# Patient Record
Sex: FEMALE | Race: WHITE
[De-identification: names, ages, dates, MRNs, and addresses within clinical notes are randomized per-mention and may not be internally consistent; named-entity substitution may affect disease eponyms.]

---

## 2019-10-11 NOTE — HP
Admitting History and Physical





- Primary Care Physician


PCP: Yan Monterroso





- Admission


Chief Complaint: Breast cancer gene positive


History of Present Illness: 





39 year old  prmenapausal female with strong family H/O breast cancer  and 

BRCA2 positive.  2019 Mammogram  showed 4 mm assymetry right lateral 

retroareolar area. Us  favored complicated cyst but additional imaging was 

advised.  Right diagnostic mammogram and US 2019 showed  this area effaced on 

spot compression  and targeted US confirms 4mm cyst cluster rght 9:00 2 cm FN 

Birad 2.  Breast MRI 2019 Birad1.


History Source: Patient


Limitations to Obtaining History: No Limitations





- Past Medical History


...LMP: 19





- Smoking History


Smoking history: Never smoked


Have you smoked in the past 12 months: No





- Alcohol/Substance Use


Hx Alcohol Use: No





Home Medications





- Allergies


Allergies/Adverse Reactions: 


 Allergies











Allergy/AdvReac Type Severity Reaction Status Date / Time


 


No Known Allergies Allergy   Verified 10/08/19 14:59














- Home Medications


Home Medications: 


Ambulatory Orders





NK [No Known Home Medication]  10/08/19 











Family Medical History


Family Hx Cancer: Mother (Bilateral breast cancer 46/64 BRCA+)


Other Family History: maternal aunt breast ca 62 and ovarian at 69.  mat 2nd 

cousins breast ca 40's and 60's.  mat 3rd cousin breast ca  40's BRCA 2 +





Physical Examination


Constitutional: Yes: No Distress


Breast(s): Yes: Other (B sized breast cups no palpable masses or adenopathy 

bilaterally symmetrical)





Problem List





- Problems


(1) BRCA gene positive


Code(s): Z15.01 - GENETIC SUSCEPTIBILITY TO MALIGNANT NEOPLASM OF BREAST; 

Z15.09 - GENETIC SUSCEPTIBILITY TO OTHER MALIGNANT NEOPLASM   





Assessment/Plan





Bilateral total mastectomies alloderm implant reconstruction

## 2019-10-17 ENCOUNTER — HOSPITAL ENCOUNTER (INPATIENT)
Dept: HOSPITAL 74 - FM/S | Age: 39
LOS: 2 days | Discharge: HOME | DRG: 585 | End: 2019-10-19
Attending: SURGERY | Admitting: SURGERY
Payer: COMMERCIAL

## 2019-10-17 VITALS — BODY MASS INDEX: 22.3 KG/M2

## 2019-10-17 DIAGNOSIS — Z90.13: ICD-10-CM

## 2019-10-17 DIAGNOSIS — Z40.01: Primary | ICD-10-CM

## 2019-10-17 DIAGNOSIS — Z15.01: ICD-10-CM

## 2019-10-17 PROCEDURE — 4A1GXSH MONITORING OF SKIN AND BREAST VASCULAR PERFUSION USING INDOCYANINE GREEN DYE, EXTERNAL APPROACH: ICD-10-PCS | Performed by: SURGERY

## 2019-10-17 PROCEDURE — 0HTV0ZZ RESECTION OF BILATERAL BREAST, OPEN APPROACH: ICD-10-PCS | Performed by: SURGERY

## 2019-10-17 PROCEDURE — 0HRV0JZ REPLACEMENT OF BILATERAL BREAST WITH SYNTHETIC SUBSTITUTE, OPEN APPROACH: ICD-10-PCS | Performed by: SURGERY

## 2019-10-17 RX ADMIN — CEFAZOLIN SODIUM SCH MLS/HR: 1 INJECTION, SOLUTION INTRAVENOUS at 15:45

## 2019-10-17 RX ADMIN — CEFAZOLIN SODIUM SCH MLS/HR: 1 INJECTION, SOLUTION INTRAVENOUS at 20:34

## 2019-10-17 NOTE — OP
Operative Note





- Note:


Operative Date: 10/17/19


Pre-Operative Diagnosis: Genetic predispostion for breast cancer


Operation: Bilateral prophylactic mastectomy.  Bilateral breast reconstruction 

with alloderm and implants


Post-Operative Diagnosis: Same as Pre-op


Surgeon: Yan Monterroso


Assistant: Lobo Ward


Anesthesiologist/CRNA: Bonilla Reynolds


Anesthesia: General


Drains & Tubes with Location: Bilateral breast drains x 2


Operative Report Dictated: Yes

## 2019-10-17 NOTE — SURG
Surgery First Assist Note


First Assist: Jerod Harrison PA-C


Date of Service: 10/17/19


Diagnosis: 





Genetic predisposition for breast cancer


Procedure: 


Bilateral breast reconstruction with alloderm and implants s/p mastectomy





I was present for the entirety of the operative procedure. For further detail, 

please refer to operative report.








Visit type





- Case Type


Case Type: Scheduled





- Emergency


Emergency Visit: No





- New patient


This patient is new to me today: Yes


Date on this admission: 10/17/19





- Critical Care


Critical Care patient: No

## 2019-10-18 LAB
DEPRECATED RDW RBC AUTO: 12.6 % (ref 11.6–15.6)
HCT VFR BLD CALC: 28.5 % (ref 32.4–45.2)
HGB BLD-MCNC: 9.4 GM/DL (ref 10.7–15.3)
MCH RBC QN AUTO: 30 PG (ref 25.7–33.7)
MCHC RBC AUTO-ENTMCNC: 32.8 G/DL (ref 32–36)
MCV RBC: 91.5 FL (ref 80–96)
PLATELET # BLD AUTO: 234 K/MM3 (ref 134–434)
PMV BLD: 8.7 FL (ref 7.5–11.1)
RBC # BLD AUTO: 3.12 M/MM3 (ref 3.6–5.2)
WBC # BLD AUTO: 9.6 K/MM3 (ref 4–10.8)

## 2019-10-18 RX ADMIN — CEFAZOLIN SODIUM SCH MLS/HR: 1 INJECTION, SOLUTION INTRAVENOUS at 08:25

## 2019-10-18 RX ADMIN — CEFAZOLIN SODIUM SCH MLS/HR: 1 INJECTION, SOLUTION INTRAVENOUS at 21:15

## 2019-10-18 RX ADMIN — ACETAMINOPHEN PRN MG: 325 TABLET ORAL at 02:04

## 2019-10-18 RX ADMIN — CEFAZOLIN SODIUM SCH MLS/HR: 1 INJECTION, SOLUTION INTRAVENOUS at 02:04

## 2019-10-18 RX ADMIN — ACETAMINOPHEN PRN MG: 325 TABLET ORAL at 23:10

## 2019-10-18 RX ADMIN — CEFAZOLIN SODIUM SCH MLS/HR: 1 INJECTION, SOLUTION INTRAVENOUS at 15:44

## 2019-10-18 NOTE — PN
Progress Note, Physician


Chief Complaint: 





S/P bilateral mastectomy with implant and alloderm reconstruction POD#1


History of Present Illness: 





Patient was seen at the bedside and reports discomfort near upper outer poles 

bilaterally but is otherwise tolerating po well.  





- Current Medication List


Current Medications: 


Active Medications





Acetaminophen (Tylenol -)  650 mg PO Q4H PRN


   PRN Reason: FEVER


   Last Admin: 10/18/19 02:04 Dose:  650 mg


Acetaminophen (Ofirmev Injection -)  1,000 mg IVPB Q6H PRN


   PRN Reason: PAIN LEVEL 6-10


   Last Admin: 10/17/19 20:22 Dose:  1,000 mg


Cefazolin Sodium (Ancef 1 Gm Premixed Ivpb -)  1 gm in 50 mls @ 100 mls/hr IVPB 

Q6H-IV GEORGE


   Stop: 10/24/19 15:44


   Last Admin: 10/18/19 08:25 Dose:  100 mls/hr


Dextrose/Sodium Chloride (D5-1/2ns -)  1,000 mls @ 100 mls/hr IV ASDIR GEORGE


Ondansetron HCl (Zofran Injection)  4 mg IVPUSH Q6H PRN


   PRN Reason: NAUSEA AND/OR VOMITING


   Last Admin: 10/17/19 21:41 Dose:  4 mg


Oxycodone HCl (Roxicodone -)  5 mg PO Q4H PRN


   PRN Reason: PAIN LEVEL 6-10


   Last Admin: 10/18/19 08:24 Dose:  5 mg


Zolpidem Tartrate (Ambien -)  5 mg PO HS PRN


   PRN Reason: Insomnia











- Objective


Vital Signs: 


 Vital Signs











Temperature  98.7 F   10/18/19 05:00


 


Pulse Rate  96 H  10/18/19 05:00


 


Respiratory Rate  18   10/18/19 05:00


 


Blood Pressure  91/50 L  10/18/19 05:00


 


O2 Sat by Pulse Oximetry (%)  95   10/18/19 08:45











Constitutional: Yes: Well Nourished, Calm


Breast(s): Yes: Other (Flaps with minimal ecchymosis.  Nipple/areola complex 

with good color.  Dressing C/D/I. JPs with serosanginous discharge bilaterally.)


Labs: 


 CBC, BMP





 10/18/19 07:35 











Problem List





- Problems


(1) BRCA gene positive


Code(s): Z15.01 - GENETIC SUSCEPTIBILITY TO MALIGNANT NEOPLASM OF BREAST; 

Z15.09 - GENETIC SUSCEPTIBILITY TO OTHER MALIGNANT NEOPLASM   





Assessment/Plan





A: S/P bilateral mastectomy with implant reconstruction POD#1


    Doing well after surgery


P:  OOB today with assistance


     AXBX and Pain control as ordered


     AMIRA training


     Plan for discharge in am

## 2019-10-18 NOTE — PN
Progress Note (short form)





- Note


Progress Note: 














POD 1, s/p Bilateral prophylactic mastectomy.  Bilateral breast reconstruction 

with alloderm and implants 











Pt seen and examined. Reports she is feeling well. Pain is well controlled. Has 

been oob to the restroom. Voiding without issue. Tolerated dinner with no n/v. 

Denies cp/sob. 

















 Vital Signs











Temp  98.7 F   10/18/19 05:00


 


Pulse  96 H  10/18/19 05:00


 


Resp  18   10/18/19 05:00


 


BP  91/50 L  10/18/19 05:00


 


Pulse Ox  93 L  10/18/19 01:00








 Intake & Output











 10/17/19 10/17/19 10/18/19





 11:59 23:59 11:59


 


Intake Total 1200 1575 


 


Output Total  480 470


 


Balance 1200 1095 -470


 


Weight 122 lb  


 


Intake:   


 


  IV 1200 1075 


 


    Lactated Ringers Solution  375 





    1,000 ml @ 125 mls/hr IV   





    ASDIR GEORGE Rx#:   





    [id number]   


 


  IVPB  100 


 


  Oral  400 


 


Output:   


 


  Drainage  80 220


 


    #1   15


 


    #2   100


 


    #3   100


 


    #4   5


 


  Urine  400 250


 


    Void  400 250


 


Other:   


 


  Voiding Method  Toilet 


 


  Height 5 ft 2 in  


 


  Body Mass Index (BMI) 22.3  


 


  Weight Measurement Method Standing Scale  








 CBC, BMP





 10/18/19 07:35 





Gen: awake, alert, nad


Resp: unlabored on RA


Chest: + b/l ecchymosis, + b/l edema, skin flaps/areola well perfused and 

viable. Dressings c/d/i, inframammary crease steristrips intact with no 

erythema or drainage, b/ll axilla wounds with steristrips intact, clean and 

dry. Drains x4 in place, tubing stripped, scant serosanguinous drainage in 

reservoirs. 








A/P: 40 y/o F w/ strong family H/O breast cancer and BRCA2 positive, now POD 1, 

s/p Bilateral prophylactic mastectomy.  Bilateral breast reconstruction with 

alloderm and implants.











afebrile, slightly tachy (likly due to pain?), bp in 90s-low 100s


Tony outputs #1->5ml overnight, 15ml since OR


         #2->30ml overnight, 100ml since OR


         #3->30ml overnight, 100ml since OR


         #4->0ml overnight, 5ml since OR








Dressings changed, tubing stripped











-Labs stable


-Monitor and record drain outputs


-Keep bra/dressings c/d/i


-Remainder of care per Dr Monterroso





d/w attending Dr Ward

## 2019-10-18 NOTE — PN
Progress Note (short form)





- Note


Progress Note: 





Post op day#1.S/P Bilateral mastectomy with reconstruction under Ga 

uneventful.Patient stable and has some pain for which she is on medication.No 

any anesthesia related problem.Patient DC from the anesthesia care.

## 2019-10-19 VITALS — DIASTOLIC BLOOD PRESSURE: 60 MMHG | SYSTOLIC BLOOD PRESSURE: 100 MMHG | TEMPERATURE: 97.8 F | HEART RATE: 68 BPM

## 2019-10-19 RX ADMIN — ACETAMINOPHEN PRN MG: 325 TABLET ORAL at 08:42

## 2019-10-19 RX ADMIN — CEFAZOLIN SODIUM SCH MLS/HR: 1 INJECTION, SOLUTION INTRAVENOUS at 08:43

## 2019-10-19 RX ADMIN — CEFAZOLIN SODIUM SCH MLS/HR: 1 INJECTION, SOLUTION INTRAVENOUS at 02:10

## 2019-10-21 NOTE — PATH
Surgical Pathology Report



Patient Name:  ONEYDA DIMAS

Accession #:  [id number]

Med. Rec. #:  [id number]                                                        

   /Age/Gender:  1980 (Age: 39) / F

Account:  [id number]                                                          

             Location: [facility]

Taken:  10/17/2019

Received:  10/17/2019

Reported:  10/21/2019

Physicians:  Yan Monterroso M.D.

  



Specimen(s) Received

A: RIGHT BREAST RETROAREOLAR BIOPSY (FS) 

B: LEFT BREAST RETROAREOLAR BIOPSY (FS) 

C: RIGHT BREAST MASTECTOMY 

D: LEFT BREAST MASTECTOMY 

E: LEFT BREAST EXTRA MAMMARY NIPPLE 

F: RIGHT BREAST EXTRA MAMMARY NIPPLE 





Clinical History

Genetic susceptibility of breast cancer





Intraoperative Consult Diagnosis

A. Right breast retroareolar biopsy, frozen section: Negative for malignancy.

B. Left breast retroareolar biopsy, frozen section: Negative for malignancy.

ELPIDIO Lima M.D. 10/17/19









Final Diagnosis

A. RETROAREOLA, RIGHT BREAST, BIOPSY (FS):

BENIGN BREAST TISSUE; NEGATIVE FOR MALIGNANCY.



B. RETROAREOLA, LEFT BREAST, BIOPSY (FS):

BENIGN BREAST TISSUE; NEGATIVE FOR MALIGNANCY.



C. BREAST, RIGHT, NIPPLE-SPARING MASTECTOMY:

BENIGN BREAST TISSUE SHOWING FIBROCYSTIC CHANGES INCLUDING MICROCYSTS WITH

APOCRINE METAPLASIA AND USUAL DUCTAL HYPERPLASIA (UDH) AND COLUMNAR CELL CHANGE.



D. BREAST, LEFT, NIPPLE-SPARING MASTECTOMY:

BENIGN BREAST TISSUE SHOWING FIBROCYSTIC CHANGES INCLUDING MICROCYSTS AND USUAL

DUCTAL HYPERPLASIA (UDH) AND FIBROADENOMATOID CHANGE.



E. EXTRAMAMMARY NIPPLE, RIGHT BREAST, EXCISION:

EXTRAMAMMARY NIPPLE.



F. THE EXTRAMAMMARY NIPPLE, LEFT BREAST, EXCISION:

EXTRAMAMMARY NIPPLE.





***Electronically Signed***

Anita Lima M.D.





Gross Description

A. Received fresh for frozen section evaluation, labeled "right breast

retroareolar biopsy" is a 0.5 x 0.4 x 0.2 cm portion of red-tan tissue. Frozen

section is performed on the specimen. The frozen section residue is entirely

submitted in one cassette.



B. Received fresh for frozen section evaluation, labeled "left breast

retroareolar biopsy" is a 0.8 x 0.6 x 0.2 cm portion of red-tan tissue. Frozen

section is performed on the specimen. The frozen section residue is entirely

submitted in one cassette.



C. Received in formalin, labeled "right mastectomy," is a 212 gram, 12.5 x 12.5

x 3.0 cm. right mastectomy specimen with a short suture marking the superior

aspect and a long suture marking the lateral aspect of the specimen, per the

surgeon. There is no skin or nipple present. The deep margin is inked black and

the anterior soft tissue margin is inked blue. The specimen is serially

sectioned from lateral to medial. Sectioning reveals multifocal dense white

fibrous tissue. Representative sections are submitted in 14 cassettes as

follows: 1-3-upper outer quadrant; 4-6-lower outer quadrant; 7-9-upper inner

quadrant; 10-12-lower inner quadrant; 13-anterior soft tissue margin; 14-deep

margin.



D. Received in formalin, labeled "left breast mastectomy," is a 229 gram, 15.0 x

11.5 x 3.2 cm. left mastectomy specimen with a short suture marking the superior

aspect and a long suture marking the lateral aspect of the specimen, per the

surgeon. There is no skin or nipple present. The deep margin is inked black and

the anterior soft tissue margin is inked blue. The specimen is serially

sectioned from medial to lateral. Sectioning reveals abundant dense, white,

focally firm fibrous tissue. Representative sections are submitted in 15

cassettes as follows: 1-3-upper outer quadrant; 4-6-lower outer quadrant;

7-8-upper inner quadrant; 9-13-lower inner quadrant; 14-anterior soft tissue

margin; 15-deep margin.

Time to formalin fixation: 30 minutes

Total formalin fixation time: Approximately 61 hours.



E. Received in formalin labeled "right breast extramammary nipple," is a 0.6 x

0.3 cm tan-brown, elliptical, unoriented portion of skin excised to depth of 1.0

cm. No discrete epidermal lesion is identified. The specimen is bisected and

entirely submitted in one cassette.



F. Received in formalin labeled "left breast extramammary nipple," is a 0.5 x

0.2 cm tan, elliptical, unoriented portion of skin excised to depth of 1.0 cm.

No discrete epidermal lesion is identified. The specimen is bisected and

entirely submitted in one cassette.

AE/10/17/2019



ebram/10/17/2019

## 2019-10-21 NOTE — OP
DATE OF OPERATION:  10/17/2019

 

PREOPERATIVE DIAGNOSIS:  Genetic susceptibility to breast cancer, BRCA2 positive.   

 

POSTOPERATIVE DIAGNOSIS:  Genetic susceptibility to breast cancer, BRCA2 positive.   

 

PROCEDURE:  Bilateral total nipple-sparing mastectomies through an inframammary

approach with bilateral direct implant reconstruction using acellular dermal matrix.

 

ANESTHESIA:  General endotracheal anesthesia.  

 

PRIMARY SURGEON:  Skylar Torres M.D. 

 

FIRST ASSIST:  CORRINE Graham 

 

Primary surgeon for the bilateral direct implant reconstructions with acellular

dermal matrix is Skylar Ward M.D., with his first assistant CORRINE Mccarthy 

 

COMPLICATIONS:  There were no complications.  

 

DESCRIPTION OF PROCEDURE:  Briefly, the patient is a 39-year-old G2, P2 premenopausal

female of [place] descent who has a family history with her mother who had

bilateral breast cancer at age 46 and age 54, and her maternal aunt had breast cancer

at age 62, and ovarian cancer at age 69.  She has 2 maternal second cousins who had

breast cancer and a maternal third cousin tested BRCA2 positive.  The patient herself

tested BRCA2 positive in January of 2019.  She had negative mammography in October of 2018 and a negative MRI in April of 2019.  She was seen in the office regarding risk

reduction strategies for breast cancer and decided to go forward with bilateral risk

reduction prophylactic nipple sparing mastectomies.  She was well aware of the risks

of leaving some breast tissue _____.  We do retroareolar biopsies at the time of

surgery; if these show cancer, we would remove the nipples.  She understood the lack

of any evidence shown for doing prophylactic sentinel lymph node biopsy. All risks,

complications of the procedure were explained to the patient including risk of skin

flap necrosis, nipple loss, hematoma, and infection, and she consented to move

forward with the procedure.  She was seen by Dr. Ward preoperatively regarding

the implant reconstruction. The patient is brought in for the procedure on October 17, 2019.  In the holding area, site verification was made, and informed consent was

obtained.  She was marked preoperatively by the plastic surgeon.  She was brought in

to the operating room and laid on the OR table in the supine position.  Venodynes

were placed on the lower extremities prior to induction.  She received 2 g of Ancef

prior to incision.  Both breasts were sterilely prepped and draped in the usual

fashion, and she underwent general endotracheal anesthesia.  The left mastectomy was

first performed after timeout was performed.  An inframammary incision was made about

9 cm in length in the inframammary fold, and then skin edges were everted, and the

breast was retracted inferiorly using Steven clamps.  The skin flap was raised using

the PEEK radiofrequency device superiorly to the level of the clavicle, medially to

the level of the sternum, laterally to the level of the latissimus, and inferiorly

below the level of the inframammary fold.  The breast was taken out off pectoralis

major muscle using electrocautery from inferomedial to superolateral, completely

removed intact.  The specimen was removed and oriented with a long lateral, short

superior suture, and sent to pathology in formalin as specimen.  The skin flaps were

inspected to remove all gross breast tissue.  A retroareolar biopsy was taken

underneath the left nipple areolar complex, sent for frozen section and came back

negative so the nipple was spared.  Hemostasis was achieved, and the wound was

copiously irrigated with warm, sterile saline.  At this point the right nipple

sparing mastectomy was performed through a symmetrical 9-cm inframammary incision on

the right side in the inframammary fold.  Skin edges were everted and the breast was

retracted inferiorly using Twiggs clamps.  The skin flap was raised using the PEEK

radiofrequency device superiorly to the level of the clavicle, medially to the level

of the sternum, laterally to the level of the latissimus, and inferiorly to the level

of the inframammary fold.  The breast was taken down off the pectoralis major muscle

using electrocautery from inferomedial to superolateral and completely removed

intact.  It was oriented with a long lateral, short superior suture, and weighed and

then sent to pathology in formalin as right breast total mastectomy.  Skin flaps were

trimmed for a good cosmetic result, and they were inspected to remove all gross

remaining breast tissue.  A retroareolar biopsy was taken underneath the right nipple

areolar complex, sent for frozen section, came back negative, so the right nipple was

spared.  Hemostasis was achieved, and the wound was copiously irrigated with warm,

sterile saline.  At this point, Dr. Ward became the primary surgeon, and he

performed bilateral subpectoral direct implant reconstruction using acellular dermal

matrix sutured into the inferolateral aspects of both pectoralis major muscles to

allow for the direct implant reconstruction.  Two Olayinka drains were placed around

each implant and brought through separate stab incisions on the lateral skin folds

and secured in place using 3-0 nylon suture.  All wounds will be closed by plastic

surgery using dissolvable monofilament suture.  Mastisol Steri-Strips were applied

over the wounds, and she will be extubated and brought to the post anesthesia care

unit.  All sponge and needle counts were correct at this point in the case, and

estimated blood loss was about 100 mL.  She was hemodynamically stable throughout. 

The patient will be admitted postoperatively for pain and wound management.  She did

have Exparel _____ as a chest wall block along the pectoralis muscle chest wall and

subcutaneous tissues for postoperative pain relief.  We did use the SPY skin

perfusion device during the case showing good skin perfusion on both skin flaps and

nipple areolar complexes at the end of the implant reconstruction.  

 

 

SKYLAR TORRES M.D.

 

[id number]

DD: 10/17/2019 18:05

DT: 10/17/2019 20:08

Job #:  [id number]

## 2019-11-06 NOTE — OP
DATE OF OPERATION:  10/17/2019

 

This is a combined dictation with Dr. Yan Monterroso.  

 

SURGEON:  Yan Ward MD  

 

ASSISTANT SURGEON:  PAMELLA Newsome

 

PREOPERATIVE DIAGNOSIS:  Bilateral acquired chest wall deformities status post

bilateral mastectomies.  

 

POSTOPERATIVE DIAGNOSES:  

1.  Bilateral acquired chest wall deformity status post bilateral mastectomies.

2.  Personal history of genetic carcinoma.

3.  Absent bilateral breasts.

 

The patient underwent bilateral mastectomies.  This was dictated under separate cover

by Dr. Monterroso.  

 

OPERATIVE PROCEDURE:

1.  Right breast immediate reconstruction utilizing immediate insertion of silicone

breast implant, AlloDerm reconstruction and mesh placement.

2.  Left breast immediate reconstruction utilizing immediate insertion of silicone

breast implant, AlloDerm reconstruction and mesh placement.

3.  Intravenous injection of indocyanine green dye and intraoperative diagnostic

evaluation of noncoronary intraoperative fluorescein vascular angiography x2 as well

as interpretation of angiogram intraoperatively.

 

OPERATIVE INDICATION:  Patient is a 39-year-old female who was brought to the

operating room by Dr. Yan Monterroso for bilateral mastectomies for a significant

genetic history of breast carcinoma.  The patient elected to undergo bilateral

mastectomies with the above reconstructive procedure in a prepectoral position.  The

risks and benefits of surgical versus nonsurgical alternatives as well as material

complications were described to the patient on multiple occasions preoperatively. 

She was marked in the standing position preoperatively in the holding area and all

questions were asked and answered.

 

OPERATIVE PROCEDURE IN DETAIL:  The patient was taken to the operating room by Dr. Monterroso where she was placed supine on the operating room table.  Both arms were

extended and padded.  Venodyne boots were placed and all areas were protected and

padded.  Dr. Mnoterroso will dictate his portion of the operation under separate cover.

 

At this point Dr. Monterroso performed bilateral mastectomies with an inframammary

incision on both the right and left breasts, which will be dictated separately.  The

right breast tissue removed was 218 grams of tissue; the left breast tissue removed

was 237 grams of tissue.  

 

Upon completion of the mastectomies, the wounds were copiously irrigated and

hemostasis was meticulously obtained throughout the pocket and both chest walls.  On

the back table, as the mastectomies were being performed, I created reconstructive

procedures by utilizing a full coverage reconstructive implant.  Sientra smooth,

round high-profile, style 107, 505 mL implants were used for the reconstruction. 

Covidien ProGrip mesh was placed in a circular fashion, cutting out the mesh slightly

larger than the back side of the implant.  The gripping portion of the mesh was

placed away from the implant in order to adhere to the pectoralis muscle.  At this

point, a sheet of AlloDerm was selected.  In this case, a 16 x 20 perforated thick

AlloDerm was used to cover the anterior surface of the entire implant and was then

sutured down to the underlying mesh over the implant and suturing it on the back

side.  Multiple 2-0 Vicryl sutures were placed around the circumference of the

implant, creating an entire enclosure for the new implant device.  Triple-antibiotic

solution and Betadine were used to cover this device and the AlloDerm was soaked in

triple-antibiotic solution x3.  Both devices were created on the back table and ready

for reconstruction.  The exact same procedure was carried out symmetrically on the

opposite left side in order to create the same device with the same size Sientra 107,

505 mL high-profile implant.

 

Once the mastectomies were completed, the left breast was attended to first.  The

device was transferred to the left chest wall and the rim which, extended beyond the

implant itself, was then attached to the chest wall using 0 V-Loc suture in a running

fashion from the medial to the lateral side to tack the mesh down to the pectoralis

muscle at the inframammary fold and laterally and medially in order to prevent motion

of the device.  At this point, good shape and contour were seen in the shape of the

breast.  Skin flaps showed viability.  The SPY intraoperative angiogram was performed

at this point by injecting 4 mL of indocyanine green dye into the intravascular

system.  An intraoperative angiogram was then used to show adequate blood flow to the

skin and nipple-areolar complex on both chest walls.  This was repeated before

placement of the implant and again after the implant was placed.  Both showed good

blood flow.  The SPY imaging system was brought into the field.  The skin flowed to

the right and left breasts and the nipple-areolar complex.  The entire skin flaps

were evaluated and seen to be viable with good blood flow.

 

The patient had the second implant placed into the right chest wall in the exact same

fashion, suturing a 0 V-Loc suture in running fashion from medial to lateral,

attaching the posterior aspect of the mesh down to the chest wall.  Once the implants

were verified in good position with good shape and contour seen, the skin flaps were

draped over the implant and down into their new anatomic position.  The skin edges

were trimmed.  A 15-Olayinka drain was threaded through a separate stab wound around the

entire circumference of the implant itself and sutured into position.  Again, copious

irrigation and hemostasis were obtained and then attention was turned to the closure.

 

Using 3-0 PDS suture on the deep dermis and tissue, the deep layer was repaired with

interrupted sutures.  Then, a 4-0 Biosyn suture was used in a subcuticular fashion to

repair the skin.  Biopatch and Dermabond with Steri-Strips were placed over the drain

and suture line.  A compression dressing with Fluff dressings, ABD gauze and a

Surgi-Bra were placed.  The patient was then awakened, extubated and transferred to

the recovery room in satisfactory condition, showing excellent contour and result at

this point.

 

 

MD TUSHAR BALDERAS/[id number]

DD: 10/17/2019 18:41

DT: 10/17/2019 21:19

Job #:  [id number]